# Patient Record
Sex: FEMALE | Race: WHITE | NOT HISPANIC OR LATINO | Employment: UNEMPLOYED | URBAN - METROPOLITAN AREA
[De-identification: names, ages, dates, MRNs, and addresses within clinical notes are randomized per-mention and may not be internally consistent; named-entity substitution may affect disease eponyms.]

---

## 2017-11-22 ENCOUNTER — HOSPITAL ENCOUNTER (EMERGENCY)
Facility: HOSPITAL | Age: 27
Discharge: HOME | End: 2017-11-22
Admitting: PHYSICIAN ASSISTANT

## 2017-11-22 ENCOUNTER — APPOINTMENT (OUTPATIENT)
Dept: CT IMAGING | Facility: HOSPITAL | Age: 27
End: 2017-11-22

## 2017-11-22 VITALS
BODY MASS INDEX: 18.4 KG/M2 | RESPIRATION RATE: 16 BRPM | HEIGHT: 62 IN | OXYGEN SATURATION: 98 % | DIASTOLIC BLOOD PRESSURE: 79 MMHG | TEMPERATURE: 99.2 F | HEART RATE: 60 BPM | WEIGHT: 100 LBS | SYSTOLIC BLOOD PRESSURE: 113 MMHG

## 2017-11-22 DIAGNOSIS — R31.9 URINARY TRACT INFECTION WITH HEMATURIA, SITE UNSPECIFIED: Primary | ICD-10-CM

## 2017-11-22 DIAGNOSIS — F12.10 MILD TETRAHYDROCANNABINOL (THC) ABUSE: ICD-10-CM

## 2017-11-22 DIAGNOSIS — R11.2 NON-INTRACTABLE VOMITING WITH NAUSEA, UNSPECIFIED VOMITING TYPE: ICD-10-CM

## 2017-11-22 DIAGNOSIS — N39.0 URINARY TRACT INFECTION WITH HEMATURIA, SITE UNSPECIFIED: Primary | ICD-10-CM

## 2017-11-22 DIAGNOSIS — K56.7 ILEUS (HCC): ICD-10-CM

## 2017-11-22 LAB
ALBUMIN SERPL-MCNC: 4.6 G/DL (ref 3.5–5.2)
ALBUMIN/GLOB SERPL: 1.3 G/DL
ALP SERPL-CCNC: 63 U/L (ref 40–129)
ALT SERPL W P-5'-P-CCNC: 11 U/L (ref 5–33)
AMPHET+METHAMPHET UR QL: NEGATIVE
AMPHETAMINES UR QL: NEGATIVE
ANION GAP SERPL CALCULATED.3IONS-SCNC: 14.2 MMOL/L
AST SERPL-CCNC: 15 U/L (ref 5–32)
B-HCG UR QL: NEGATIVE
BACTERIA UR QL AUTO: ABNORMAL /HPF
BARBITURATES UR QL SCN: NEGATIVE
BASOPHILS # BLD AUTO: 0.06 10*3/MM3 (ref 0–0.2)
BASOPHILS NFR BLD AUTO: 0.4 % (ref 0–2)
BENZODIAZ UR QL SCN: NEGATIVE
BILIRUB SERPL-MCNC: 0.6 MG/DL (ref 0.2–1.2)
BILIRUB UR QL STRIP: ABNORMAL
BUN BLD-MCNC: 18 MG/DL (ref 6–20)
BUN/CREAT SERPL: 22.8 (ref 7–25)
BUPRENORPHINE SERPL-MCNC: NEGATIVE NG/ML
CALCIUM SPEC-SCNC: 9.5 MG/DL (ref 8.6–10.5)
CANNABINOIDS SERPL QL: POSITIVE
CHLORIDE SERPL-SCNC: 97 MMOL/L (ref 98–107)
CLARITY UR: ABNORMAL
CO2 SERPL-SCNC: 24.8 MMOL/L (ref 22–29)
COCAINE UR QL: NEGATIVE
COLOR UR: ABNORMAL
CREAT BLD-MCNC: 0.79 MG/DL (ref 0.57–1)
DEPRECATED RDW RBC AUTO: 45.6 FL (ref 37–54)
EOSINOPHIL # BLD AUTO: 0.01 10*3/MM3 (ref 0.1–0.3)
EOSINOPHIL NFR BLD AUTO: 0.1 % (ref 0–4)
ERYTHROCYTE [DISTWIDTH] IN BLOOD BY AUTOMATED COUNT: 13.3 % (ref 11.5–14.5)
FLUAV AG NPH QL: NEGATIVE
FLUBV AG NPH QL IA: NEGATIVE
GFR SERPL CREATININE-BSD FRML MDRD: 87 ML/MIN/1.73
GLOBULIN UR ELPH-MCNC: 3.5 GM/DL
GLUCOSE BLD-MCNC: 111 MG/DL (ref 65–99)
GLUCOSE UR STRIP-MCNC: NEGATIVE MG/DL
HCT VFR BLD AUTO: 42.3 % (ref 37–47)
HGB BLD-MCNC: 14.7 G/DL (ref 12–16)
HGB UR QL STRIP.AUTO: ABNORMAL
HYALINE CASTS UR QL AUTO: ABNORMAL /LPF
IMM GRANULOCYTES # BLD: 0.05 10*3/MM3 (ref 0–0.03)
IMM GRANULOCYTES NFR BLD: 0.4 % (ref 0–0.5)
KETONES UR QL STRIP: ABNORMAL
LEUKOCYTE ESTERASE UR QL STRIP.AUTO: ABNORMAL
LIPASE SERPL-CCNC: 22 U/L (ref 13–60)
LYMPHOCYTES # BLD AUTO: 2.18 10*3/MM3 (ref 0.6–4.8)
LYMPHOCYTES NFR BLD AUTO: 15.3 % (ref 20–45)
MCH RBC QN AUTO: 32.7 PG (ref 27–31)
MCHC RBC AUTO-ENTMCNC: 34.8 G/DL (ref 31–37)
MCV RBC AUTO: 94.2 FL (ref 81–99)
METHADONE UR QL SCN: NEGATIVE
MONOCYTES # BLD AUTO: 0.62 10*3/MM3 (ref 0–1)
MONOCYTES NFR BLD AUTO: 4.4 % (ref 3–8)
NEUTROPHILS # BLD AUTO: 11.29 10*3/MM3 (ref 1.5–8.3)
NEUTROPHILS NFR BLD AUTO: 79.4 % (ref 45–70)
NITRITE UR QL STRIP: POSITIVE
NRBC BLD MANUAL-RTO: 0 /100 WBC (ref 0–0)
OPIATES UR QL: POSITIVE
OXYCODONE UR QL SCN: NEGATIVE
PCP UR QL SCN: NEGATIVE
PH UR STRIP.AUTO: 5.5 [PH] (ref 4.5–8)
PLATELET # BLD AUTO: 418 10*3/MM3 (ref 140–500)
PMV BLD AUTO: 9.8 FL (ref 7.4–10.4)
POTASSIUM BLD-SCNC: 3.8 MMOL/L (ref 3.5–5.2)
PROPOXYPH UR QL: NEGATIVE
PROT SERPL-MCNC: 8.1 G/DL (ref 6–8.5)
PROT UR QL STRIP: ABNORMAL
RBC # BLD AUTO: 4.49 10*6/MM3 (ref 4.2–5.4)
RBC # UR: ABNORMAL /HPF
REF LAB TEST METHOD: ABNORMAL
SODIUM BLD-SCNC: 136 MMOL/L (ref 136–145)
SP GR UR STRIP: 1.02 (ref 1–1.03)
SQUAMOUS #/AREA URNS HPF: ABNORMAL /HPF
TRICYCLICS UR QL SCN: NEGATIVE
UROBILINOGEN UR QL STRIP: ABNORMAL
WBC NRBC COR # BLD: 14.21 10*3/MM3 (ref 4.8–10.8)
WBC UR QL AUTO: ABNORMAL /HPF

## 2017-11-22 PROCEDURE — 25010000002 ONDANSETRON PER 1 MG: Performed by: PHYSICIAN ASSISTANT

## 2017-11-22 PROCEDURE — 80053 COMPREHEN METABOLIC PANEL: CPT | Performed by: PHYSICIAN ASSISTANT

## 2017-11-22 PROCEDURE — 87804 INFLUENZA ASSAY W/OPTIC: CPT | Performed by: PHYSICIAN ASSISTANT

## 2017-11-22 PROCEDURE — 85025 COMPLETE CBC W/AUTO DIFF WBC: CPT | Performed by: PHYSICIAN ASSISTANT

## 2017-11-22 PROCEDURE — 81025 URINE PREGNANCY TEST: CPT | Performed by: PHYSICIAN ASSISTANT

## 2017-11-22 PROCEDURE — 74177 CT ABD & PELVIS W/CONTRAST: CPT

## 2017-11-22 PROCEDURE — 99284 EMERGENCY DEPT VISIT MOD MDM: CPT | Performed by: PHYSICIAN ASSISTANT

## 2017-11-22 PROCEDURE — 25010000002 CEFTRIAXONE PER 250 MG: Performed by: PHYSICIAN ASSISTANT

## 2017-11-22 PROCEDURE — 83690 ASSAY OF LIPASE: CPT | Performed by: PHYSICIAN ASSISTANT

## 2017-11-22 PROCEDURE — 96375 TX/PRO/DX INJ NEW DRUG ADDON: CPT

## 2017-11-22 PROCEDURE — 96361 HYDRATE IV INFUSION ADD-ON: CPT

## 2017-11-22 PROCEDURE — 96365 THER/PROPH/DIAG IV INF INIT: CPT

## 2017-11-22 PROCEDURE — 87086 URINE CULTURE/COLONY COUNT: CPT | Performed by: PHYSICIAN ASSISTANT

## 2017-11-22 PROCEDURE — 81001 URINALYSIS AUTO W/SCOPE: CPT | Performed by: PHYSICIAN ASSISTANT

## 2017-11-22 PROCEDURE — 99284 EMERGENCY DEPT VISIT MOD MDM: CPT

## 2017-11-22 PROCEDURE — 25010000002 HYDROMORPHONE PER 4 MG: Performed by: EMERGENCY MEDICINE

## 2017-11-22 PROCEDURE — 80306 DRUG TEST PRSMV INSTRMNT: CPT | Performed by: PHYSICIAN ASSISTANT

## 2017-11-22 PROCEDURE — 0 IOPAMIDOL PER 1 ML: Performed by: PHYSICIAN ASSISTANT

## 2017-11-22 RX ORDER — SODIUM CHLORIDE 0.9 % (FLUSH) 0.9 %
10 SYRINGE (ML) INJECTION AS NEEDED
Status: DISCONTINUED | OUTPATIENT
Start: 2017-11-22 | End: 2017-11-22 | Stop reason: HOSPADM

## 2017-11-22 RX ORDER — ONDANSETRON 2 MG/ML
4 INJECTION INTRAMUSCULAR; INTRAVENOUS ONCE
Status: COMPLETED | OUTPATIENT
Start: 2017-11-22 | End: 2017-11-22

## 2017-11-22 RX ORDER — ACETAMINOPHEN 325 MG/1
650 TABLET ORAL ONCE
Status: COMPLETED | OUTPATIENT
Start: 2017-11-22 | End: 2017-11-22

## 2017-11-22 RX ORDER — ONDANSETRON 4 MG/1
4 TABLET, ORALLY DISINTEGRATING ORAL 4 TIMES DAILY PRN
Qty: 12 TABLET | Refills: 0 | OUTPATIENT
Start: 2017-11-22 | End: 2022-09-09

## 2017-11-22 RX ORDER — CEFUROXIME AXETIL 250 MG/1
250 TABLET ORAL 2 TIMES DAILY
Qty: 20 TABLET | Refills: 0 | Status: SHIPPED | OUTPATIENT
Start: 2017-11-22 | End: 2017-12-02

## 2017-11-22 RX ORDER — METOCLOPRAMIDE 10 MG/1
5 TABLET ORAL ONCE
Status: COMPLETED | OUTPATIENT
Start: 2017-11-22 | End: 2017-11-22

## 2017-11-22 RX ADMIN — CEFTRIAXONE 1 G: 1 INJECTION, SOLUTION INTRAVENOUS at 15:48

## 2017-11-22 RX ADMIN — ACETAMINOPHEN 650 MG: 325 TABLET, FILM COATED ORAL at 15:42

## 2017-11-22 RX ADMIN — SODIUM CHLORIDE 1000 ML: 9 INJECTION, SOLUTION INTRAVENOUS at 13:23

## 2017-11-22 RX ADMIN — ONDANSETRON 4 MG: 2 INJECTION, SOLUTION INTRAMUSCULAR; INTRAVENOUS at 13:25

## 2017-11-22 RX ADMIN — IOPAMIDOL 100 ML: 755 INJECTION, SOLUTION INTRAVENOUS at 14:07

## 2017-11-22 RX ADMIN — SODIUM CHLORIDE 500 ML: 9 INJECTION, SOLUTION INTRAVENOUS at 15:45

## 2017-11-22 RX ADMIN — METOCLOPRAMIDE 5 MG: 10 TABLET ORAL at 15:32

## 2017-11-22 RX ADMIN — HYDROMORPHONE HYDROCHLORIDE 0.5 MG: 1 INJECTION, SOLUTION INTRAMUSCULAR; INTRAVENOUS; SUBCUTANEOUS at 13:30

## 2017-11-22 NOTE — DISCHARGE INSTRUCTIONS
Return to the emergency department with worsening symptoms, uncontrolled pain, inability to tolerate oral liquids, fever greater than 101°F not controlled by Tylenol or as needed with emergent concerns.    Clear liquid diet for the next 48 hours, then advance to bland as tolerated (bananas, rice, toast, applesauce, saltine crackers), then to regular as tolerated.  Avoid narcotic medications and marijuana.

## 2017-11-22 NOTE — ED NOTES
Pt is reluctant to be discharged.  She thought that she had a blockage.  An ileus was explained to her and her father.  She then made two trips to the bathroom.  Patient's father co-signed her discharge instructions.     Elyse Mcmillan RN  11/22/17 5817

## 2017-11-22 NOTE — ED PROVIDER NOTES
Subjective   History of Present Illness  History of Present Illness    Chief complaint: Abdominal pain, nausea and vomiting    Location: Mid upper abdomen without radiation    Quality/Severity:  Sharp, severe    Timing/Duration: Pain started yesterday, nausea and vomiting started early this morning around 2 AM    Modifying Factors: Oral intake makes worse.  Nothing makes better.    Associated Symptoms: Positive nausea and vomiting.  Denies hematemesis.  Denies diarrhea or constipation.  Denies fevers but does have chills.  Denies chest pain or shortness of breath.  Denies cough.  Denies dysuria, hematuria, urgency or frequency.    Narrative: 27-year-old female presents with nausea vomiting and abdominal pain.  She denies eating any undercooked food.  She denies any travel.  She denies any sick contacts.    Review of Systems  General: Denies fevers or chills.  Denies any weakness or fatigue.  Denies any weight loss or weight gain.  SKIN: Denies any rashes lesions or ulcers.  Denies color change.    HEENT:  Denies any change in vision.  LUNGS: Denies any shortness of breath or wheezing.    CARDIAC: Denies any chest pain.  Denies palpitations.  Denies syncope.  Denies any edema  ABD: As above  : Denies any dysuria, urgency, frequency or hematuria.  Currently on period.  NEURO: Denies any weakness.  Denies headache.  Denies seizures.  Denies changes in speech or difficulty walking.  M/S: Denies arthralgias, back pain, myalgias or neck pain  PSYCH: Negative for suicidal ideas. Denies anxiety or depression   review was performed in addition to those in the above all other reviews are negative.    History reviewed. No pertinent past medical history.    No Known Allergies    History reviewed. No pertinent surgical history.    History reviewed. No pertinent family history.    Social History     Social History   • Marital status: Single     Spouse name: N/A   • Number of children: N/A   • Years of education: N/A     Social  History Main Topics   • Smoking status: Current Every Day Smoker     Packs/day: 1.00     Types: Cigarettes   • Smokeless tobacco: None   • Alcohol use No   • Drug use: No   • Sexual activity: Defer     Other Topics Concern   • None     Social History Narrative   • None       Current Facility-Administered Medications:   •  HYDROmorphone (DILAUDID) injection 0.5 mg, 0.5 mg, Intravenous, Once, Emile Viveros MD  •  ondansetron (ZOFRAN) injection 4 mg, 4 mg, Intravenous, Once, Noa Wilburn PA-C  •  sodium chloride 0.9 % bolus 1,000 mL, 1,000 mL, Intravenous, Once, Noa Wilburn PA-C  •  Insert peripheral IV, , , Once **AND** sodium chloride 0.9 % flush 10 mL, 10 mL, Intravenous, PRN, Noa Wilburn PA-C  No current outpatient prescriptions on file.        Objective   Physical Exam  Vitals:    11/22/17 1242   BP: 139/86   Pulse: 64   Resp: 18   Temp: 98.1 °F (36.7 °C)   SpO2: 99%     GENERAL: Alert and oriented ×4.  No apparent distress.  Smells of THC  SKIN: Warm, pink and dry  HEENT: Atraumatic normocephalic  LUNGS: Clear to auscultation bilaterally without wheezes, rales or rhonchi  CARDIAC: Regular rate and rhythm.  S1 and S2.  No murmurs, rubs or gallops.  ABD: Soft, diffusely tender with greatest area of tenderness to the mid epigastric.  No guarding or rebound tenderness and positive bowel sounds  M/S: MAEW, no deformity  PSYCH: Normal mood and affect    Procedures         ED Course  ED Course    IV fluids, Zofran, Dilaudid given  Reglan given.  Results for orders placed or performed during the hospital encounter of 11/22/17   Influenza Antigen, Rapid - Swab, Nasopharynx   Result Value Ref Range    Influenza A Ag, EIA Negative Negative    Influenza B Ag, EIA Negative Negative   Comprehensive Metabolic Panel   Result Value Ref Range    Glucose 111 (H) 65 - 99 mg/dL    BUN 18 6 - 20 mg/dL    Creatinine 0.79 0.57 - 1.00 mg/dL    Sodium 136 136 - 145 mmol/L    Potassium 3.8 3.5 - 5.2 mmol/L    Chloride 97  (L) 98 - 107 mmol/L    CO2 24.8 22.0 - 29.0 mmol/L    Calcium 9.5 8.6 - 10.5 mg/dL    Total Protein 8.1 6.0 - 8.5 g/dL    Albumin 4.60 3.50 - 5.20 g/dL    ALT (SGPT) 11 5 - 33 U/L    AST (SGOT) 15 5 - 32 U/L    Alkaline Phosphatase 63 40 - 129 U/L    Total Bilirubin 0.6 0.2 - 1.2 mg/dL    eGFR Non African Amer 87 >60 mL/min/1.73    Globulin 3.5 gm/dL    A/G Ratio 1.3 g/dL    BUN/Creatinine Ratio 22.8 7.0 - 25.0    Anion Gap 14.2 mmol/L   Pregnancy, Urine - Urine, Clean Catch   Result Value Ref Range    HCG, Urine QL Negative Negative   Urinalysis With / Culture If Indicated - Urine, Catheter   Result Value Ref Range    Color, UA Red (A) Yellow, Straw    Appearance, UA Turbid (A) Clear    pH, UA 5.5 4.5 - 8.0    Specific Gravity, UA 1.020 1.003 - 1.030    Glucose, UA Negative Negative    Ketones, UA 15 mg/dL (1+) (A) Negative, 80 mg/dL (3+), >=160 mg/dL (4+)    Bilirubin, UA Moderate (2+) (A) Negative    Blood, UA Large (3+) (A) Negative    Protein, UA >=300 mg/dL (3+) (A) Negative    Leuk Esterase, UA Large (3+) (A) Negative    Nitrite, UA Positive (A) Negative    Urobilinogen, UA 2.0 E.U./dL (A) 0.2 - 1.0 E.U./dL   Urine Drug Screen - Urine, Clean Catch   Result Value Ref Range    THC, Screen, Urine Positive (A) Negative    Phencyclidine (PCP), Urine Negative Negative    Cocaine Screen, Urine Negative Negative    Methamphetamine, Urine Negative Negative    Opiate Screen Positive (A) Negative    Amphetamine Screen, Urine Negative Negative    Benzodiazepine Screen, Urine Negative Negative    Tricyclic Antidepressants Screen Negative Negative    Methadone Screen, Urine Negative Negative    Barbiturates Screen, Urine Negative Negative    Oxycodone Screen, Urine Negative Negative    Propoxyphene Screen Negative Negative    Buprenorphine, Screen, Urine Negative Negative   Lipase   Result Value Ref Range    Lipase 22 13 - 60 U/L   CBC Auto Differential   Result Value Ref Range    WBC 14.21 (H) 4.80 - 10.80 10*3/mm3    RBC  4.49 4.20 - 5.40 10*6/mm3    Hemoglobin 14.7 12.0 - 16.0 g/dL    Hematocrit 42.3 37.0 - 47.0 %    MCV 94.2 81.0 - 99.0 fL    MCH 32.7 (H) 27.0 - 31.0 pg    MCHC 34.8 31.0 - 37.0 g/dL    RDW 13.3 11.5 - 14.5 %    RDW-SD 45.6 37.0 - 54.0 fl    MPV 9.8 7.4 - 10.4 fL    Platelets 418 140 - 500 10*3/mm3    Neutrophil % 79.4 (H) 45.0 - 70.0 %    Lymphocyte % 15.3 (L) 20.0 - 45.0 %    Monocyte % 4.4 3.0 - 8.0 %    Eosinophil % 0.1 0.0 - 4.0 %    Basophil % 0.4 0.0 - 2.0 %    Immature Grans % 0.4 0.0 - 0.5 %    Neutrophils, Absolute 11.29 (H) 1.50 - 8.30 10*3/mm3    Lymphocytes, Absolute 2.18 0.60 - 4.80 10*3/mm3    Monocytes, Absolute 0.62 0.00 - 1.00 10*3/mm3    Eosinophils, Absolute 0.01 (L) 0.10 - 0.30 10*3/mm3    Basophils, Absolute 0.06 0.00 - 0.20 10*3/mm3    Immature Grans, Absolute 0.05 (H) 0.00 - 0.03 10*3/mm3    nRBC 0.0 0.0 - 0.0 /100 WBC   Urinalysis, Microscopic Only - Urine, Clean Catch   Result Value Ref Range    RBC, UA Too Numerous to Count (A) None Seen /HPF    WBC, UA Unable to determine due to loaded field (A) None Seen /HPF    Bacteria, UA Unable to determine due to loaded field (A) None Seen /HPF    Squamous Epithelial Cells, UA Unable to determine due to loaded field (A) None Seen, 0-2 /HPF    Hyaline Casts, UA Unable to determine due to loaded field None Seen /LPF    Methodology Manual Light Microscopy      Reviewed CT a/p. Independently viewed by me. Interpreted by radiologist. Discussed with pt. discussing importance of returning to the ER if any worsening symptoms that ileus could progress to a bowel obstruction.  I offered admission, the patient does not want to stay.    Ct Abdomen Pelvis With Contrast    Result Date: 11/22/2017  Narrative: CT ABDOMEN PELVIS  HISTORY: Abdominal pain in the umbilicus area with nausea and vomiting since yesterday.  TECHNIQUE: Axial images were obtained through the abdomen and pelvis following IV contrast administration. Multiplanar reformats were obtained. A  comparison is made with 08/13/2012. Radiation dose reduction techniques were utilized, including automated exposure control and exposure modulation based on body size.  ABDOMEN FINDINGS: Lung bases are clear. There are multiple small hypodense lesions in the liver. These are all present on the study from 2012 and are felt to be benign. Solid organs are otherwise normal. Gallbladder is unremarkable. No biliary obstruction. Unopacified GI tract is grossly normal. No free fluid.  PELVIS FINDINGS: Urinary bladder is within normal limits. Patient is status post bilateral tubal ligation. There is a 12 mm right ovarian cyst. Solid pelvic organs are otherwise normal. Gas-filled loops of distal small bowel suggest a mild ileus. The unopacified GI tract is otherwise normal.      Impression: 1. Gas-filled loops of otherwise normal-appearing distal small bowel may reflect a mild ileus. No bowel obstruction is seen. The unopacified GI tract is otherwise within normal limits. 2. 12 mm right ovarian cyst. 3. Status post tubal ligation.  This report was finalized on 11/22/2017 3:00 PM by Dr. Scott Yu MD.      Rocephin for UTI- d/c with ceftin and zofran. Educated pt.  she understands medications to avoid as well as symptoms that would cause her to need to come back to the ER                MDM  Number of Diagnoses or Management Options  Ileus: new and requires workup  Mild tetrahydrocannabinol (THC) abuse: new and requires workup  Non-intractable vomiting with nausea, unspecified vomiting type: new and requires workup  Urinary tract infection with hematuria, site unspecified: new and requires workup     Amount and/or Complexity of Data Reviewed  Clinical lab tests: reviewed and ordered  Tests in the radiology section of CPT®: reviewed and ordered  Tests in the medicine section of CPT®: ordered and reviewed  Independent visualization of images, tracings, or specimens: yes    Risk of Complications, Morbidity, and/or  Mortality  Presenting problems: moderate  Diagnostic procedures: moderate  Management options: high    Patient Progress  Patient progress: improved    My differential diagnosis for abdominal pain includes but is not limited to:  Gastritis, gastroenteritis, peptic ulcer disease, GERD, esophageal perforation, acute appendicitis, mesenteric adenitis, Meckel’s diverticulum, epiploic appendagitis, diverticulitis, colon cancer, ulcerative colitis, Crohn’s disease, intussusception, small bowel obstruction, adhesions, ischemic bowel, perforated viscus, ileus, obstipation, biliary colic, cholecystitis, cholelithiasis, Maxime-Celestine Oliverio, hepatitis, pancreatitis, common bile duct obstruction, cholangitis, bile leak, splenic trauma, splenic rupture, splenic infarction, splenic abscess, abdominal abscess, ascites, spontaneous bacterial peritonitis, hernia, UTI, cystitis, ureterolithiasis, urinary obstruction, ovarian cyst, torsion, pregnancy, ectopic pregnancy, PID, pelvic abscess, mittelschmerz, endometriosis, AAA, myocardial infarction, pneumonia, cancer, porphyria, DKA, medications, sickle cell, viral syndrome, zoster    Final diagnoses:   Urinary tract infection with hematuria, site unspecified   Ileus   Non-intractable vomiting with nausea, unspecified vomiting type   Mild tetrahydrocannabinol (THC) abuse     Dictated utilizing Dragon dictation           Noa Wilburn PA-C  11/22/17 [id number]

## 2017-11-23 LAB
BACTERIA SPEC AEROBE CULT: NORMAL
BACTERIA SPEC AEROBE CULT: NORMAL

## 2022-05-20 ENCOUNTER — HOSPITAL ENCOUNTER (OUTPATIENT)
Dept: PSYCHIATRY | Age: 32
Setting detail: THERAPIES SERIES
Discharge: HOME | End: 2022-05-20

## 2022-05-20 VITALS
RESPIRATION RATE: 17 BRPM | OXYGEN SATURATION: 98 % | TEMPERATURE: 97.4 F | SYSTOLIC BLOOD PRESSURE: 114 MMHG | HEART RATE: 105 BPM | DIASTOLIC BLOOD PRESSURE: 85 MMHG

## 2022-05-20 RX ORDER — DIVALPROEX SODIUM 250 MG/1
250 TABLET, DELAYED RELEASE ORAL 2 TIMES DAILY
Qty: 60 TABLET | Refills: 0 | Status: SHIPPED | OUTPATIENT
Start: 2022-05-20

## 2022-05-21 NOTE — H&P
[address]                 [address] [address]                              HISTORY AND PHYSICAL    PATIENT NAME: Radha Pereira                       :        1990  MED REC NO:   [id number]                          ROOM:  ACCOUNT NO:   [de-identified]                           ADMIT DATE: 2022  PROVIDER:     Joyce Elizalde MD    CHIEF COMPLAINT:  Mood lability. HISTORY OF PRESENT ILLNESS:  The patient is a 69-year-old female who is  referred for evaluation by Dr. Yasmeen Raymond from [address] [place] [address] which is an  outpatient service. The patient was initially involved with Dr. Shade Hicks  through the court system. She had done a DUI and was smoking marijuana  with her daughter in the car. CPS got involved. She was kicked out of  the family home and had the custody to her mother-in-law. Currently,  the 10year-old and 70-year-old live with their dad. She stated that  \"wanted an evaluation for parenting skills. \"  Dr. Shade Hicks according to the  patient reported that she had minimal concern and the patient was seeing  her children in public at this time. The patient is concerned about her  moods. She states that she will become somewhat hypomanic at times with  restlessness and becoming easily agitated. She wakes up hourly at night  and is very reactive to choices. She stated that she has been this way  most of her life. She states that marijuana does help her calm down as  she becomes very irritated, but does not become physically aggressive. She is verbally reactive, very talkative and pressured speech. She  denies any prior suicidal ideation or attempts. PAST MEDICATIONS:  Include Xanax, Zoloft, Paxil, citalopram, which she  feels are not helpful. PRIOR PSYCHIATRIC HISTORY:  Inpatient at the [facility] in  for 28 days,  diagnosed with ADHD, bipolar disorder, depression, and marijuana use.    Outpatient, she goes to [facility] for therapy weekly, went to [facility] in  2018 and has been seeing Dr. Wood Mohamud through court. CURRENT MEDICATIONS:  None. MEDICAL HISTORY:  She is healthy. LEGAL ISSUES:  She has a DUI and marijuana possession charge. TRAUMA HISTORY:  At age 20, she discovered that her grandfather is her  biological father and father is her biological brother. At 20 years of  age, mother left for two years with a \"Fernando\" and she had to help raise  the family. Mother is back in there and she has now established some  type of relationship with her again. SOCIAL HISTORY:  Lives in her own home with 25year-old niece. ALLERGIES TO MEDICATIONS:  None. FAMILY PSYCHIATRIC HISTORY:  Aunt, bipolar. Brother, drug addiction and  clean now. VITAL SIGNS:  Temperature 97.4, pulse 105, respirations 17, blood  pressure 114/85. MENTAL STATUS EXAM:  The patient is a very pleasant, cooperative,  39-year-old female. She made good eye contact. She engaged well. She  spoke spontaneously, but some slight pressure to that. Speech was  slightly rapid. Denied any threats to harm self or others. Denied any  auditory or visual hallucinations. Speech showed logical and coherent  thoughts. Insight and judgment are intact. She is oriented to person,  place and time. Fund of knowledge and language are good. Attention and  concentration were good. Able to recall the objects immediately. DIAGNOSES:  Axis I:  Cyclothymia. Axis II:  Deferred. Axis III:  Unremarkable. Axis IV:  Moderate. Axis V:  75. PLAN:  1. We will begin a trial of Depakote 250 mg b.i.d. for mood  stabilization. Explained risks, benefits, side effects of medications. 2.  We will order laboratories including a TSH to rule out thyroid  abnormalities as well as a comprehensive metabolic profile with renal  and hepatic function as well as a CBC with differential.  3.  Will be back within the next month and also obtain a Depakote level  at that time.   4.  Continue with  [facility] for outpatient treatment. Spent approximately 70 minutes on this eval with more than 50% of time  discussing patient care and treatment options.         Valeria Elaine MD    D: 05/20/2022 17:10:03       T: 05/20/2022 17:12:47     [email or web address]  Job#: [id number]     Doc#: [id number]    CC:

## 2022-06-21 ENCOUNTER — FOLLOWUP TELEPHONE ENCOUNTER (OUTPATIENT)
Dept: PSYCHIATRY | Age: 32
End: 2022-06-21

## 2022-06-22 ENCOUNTER — HOSPITAL ENCOUNTER (OUTPATIENT)
Dept: PSYCHIATRY | Age: 32
Setting detail: THERAPIES SERIES
Discharge: HOME | End: 2022-06-22

## 2022-06-28 ENCOUNTER — FOLLOWUP TELEPHONE ENCOUNTER (OUTPATIENT)
Dept: PSYCHIATRY | Age: 32
End: 2022-06-28

## 2022-06-29 ENCOUNTER — HOSPITAL ENCOUNTER (OUTPATIENT)
Dept: PSYCHIATRY | Age: 32
Setting detail: THERAPIES SERIES
Discharge: HOME | End: 2022-06-29
Payer: MEDICAID

## 2022-06-29 ENCOUNTER — HOSPITAL ENCOUNTER (OUTPATIENT)
Age: 32
Discharge: HOME | End: 2022-06-29
Payer: MEDICAID

## 2022-06-29 VITALS
TEMPERATURE: 97.5 F | RESPIRATION RATE: 17 BRPM | OXYGEN SATURATION: 99 % | SYSTOLIC BLOOD PRESSURE: 107 MMHG | DIASTOLIC BLOOD PRESSURE: 71 MMHG | HEART RATE: 69 BPM

## 2022-06-29 DIAGNOSIS — F34.0 CYCLOTHYMIC DISORDER: ICD-10-CM

## 2022-06-29 LAB
A/G RATIO: 1.8 (ref 1.1–2.2)
ALBUMIN SERPL-MCNC: 4.3 G/DL (ref 3.4–5)
ALP BLD-CCNC: 60 U/L (ref 40–129)
ALT SERPL-CCNC: 25 U/L (ref 10–40)
ANION GAP SERPL CALCULATED.3IONS-SCNC: 10 MMOL/L (ref 3–16)
AST SERPL-CCNC: 26 U/L (ref 15–37)
BASOPHILS ABSOLUTE: 0 K/UL (ref 0–0.2)
BASOPHILS RELATIVE PERCENT: 0.5 %
BILIRUB SERPL-MCNC: <0.2 MG/DL (ref 0–1)
BUN BLDV-MCNC: 9 MG/DL (ref 7–20)
CALCIUM SERPL-MCNC: 9 MG/DL (ref 8.3–10.6)
CHLORIDE BLD-SCNC: 104 MMOL/L (ref 99–110)
CO2: 25 MMOL/L (ref 21–32)
CREAT SERPL-MCNC: 0.8 MG/DL (ref 0.6–1.1)
EOSINOPHILS ABSOLUTE: 0.1 K/UL (ref 0–0.6)
EOSINOPHILS RELATIVE PERCENT: 1.2 %
GFR AFRICAN AMERICAN: >60
GFR NON-AFRICAN AMERICAN: >60
GLUCOSE BLD-MCNC: 106 MG/DL (ref 70–99)
HCT VFR BLD CALC: 34.9 % (ref 36–48)
HEMOGLOBIN: 11.9 G/DL (ref 12–16)
LYMPHOCYTES ABSOLUTE: 1.7 K/UL (ref 1–5.1)
LYMPHOCYTES RELATIVE PERCENT: 23.3 %
MCH RBC QN AUTO: 32.7 PG (ref 26–34)
MCHC RBC AUTO-ENTMCNC: 33.9 G/DL (ref 31–36)
MCV RBC AUTO: 96.3 FL (ref 80–100)
MONOCYTES ABSOLUTE: 0.5 K/UL (ref 0–1.3)
MONOCYTES RELATIVE PERCENT: 6.7 %
NEUTROPHILS ABSOLUTE: 4.9 K/UL (ref 1.7–7.7)
NEUTROPHILS RELATIVE PERCENT: 68.3 %
PDW BLD-RTO: 14.6 % (ref 12.4–15.4)
PLATELET # BLD: 279 K/UL (ref 135–450)
PMV BLD AUTO: 8.7 FL (ref 5–10.5)
POTASSIUM REFLEX MAGNESIUM: 4.1 MMOL/L (ref 3.5–5.1)
RBC # BLD: 3.63 M/UL (ref 4–5.2)
SODIUM BLD-SCNC: 139 MMOL/L (ref 136–145)
TOTAL PROTEIN: 6.7 G/DL (ref 6.4–8.2)
TSH REFLEX: 1.98 UIU/ML (ref 0.27–4.2)
WBC # BLD: 7.2 K/UL (ref 4–11)

## 2022-06-29 PROCEDURE — 80053 COMPREHEN METABOLIC PANEL: CPT

## 2022-06-29 PROCEDURE — 84443 ASSAY THYROID STIM HORMONE: CPT

## 2022-06-29 PROCEDURE — 99214 OFFICE O/P EST MOD 30 MIN: CPT | Performed by: PSYCHIATRY & NEUROLOGY

## 2022-06-29 PROCEDURE — 85025 COMPLETE CBC W/AUTO DIFF WBC: CPT

## 2022-06-29 PROCEDURE — 36415 COLL VENOUS BLD VENIPUNCTURE: CPT

## 2022-06-29 NOTE — PROGRESS NOTES
Department of Psychiatry  AttendingProgress Note  Chief Complaint: mood lability  Jeremy Ayala is doing well today. She started Depakote for a few days and stopped as she was tired. Restarted it 2 days ago. Has not had lab work. Is excited since she was given supervised visitation for her 2 days  6/20, and will get unsupervised visits starting 7/20. Will order labs today and get Depakote level after she has been on it for at least a full week. Patient's chart was reviewed and collaborated with  about the treatment plan. SUBJECTIVE:    Patient is feeling better. Suicidal ideation:  denies suicidal ideation. Patient does not have medication side effects. ROS: Patient has new complaints: no  Sleeping adequately:  Yes   Appetite adequate: Yes  Attending groups: No: NA  Visitors:Na    OBJECTIVE    Physical  VITALS:  /71   Pulse 69   Temp 97.5 °F (36.4 °C) (Temporal)   Resp 17   SpO2 99%     Mental Status Examination:  Patients appearance was well-appearing. Thoughts are Goal directed. Homicidal ideations none. No abnormal movements, tics or mannerisms. Memory intact Aims 0. Concentration Good. Alert and oriented X 4. Insight and Judgement normal insight and judgment. Patient was cooperative. Patient gait normal. Mood within normal limits, affect normal affect Hallucinations Absent, suicidal ideations no specific plan to harm self Speech normal volume  Data  Labs:   No visits with results within 2 Day(s) from this visit. Latest known visit with results is:   No results found for any previous visit. Medications  No current facility-administered medications for this encounter. ASSESSMENT AND PLAN    Principal Problem:    Cyclothymic disorder  Resolved Problems:    * No resolved hospital problems. *       1. Patient s symptoms   are improving. Follow up with Dr. Winifred Estrella at [facility]  2. Probable discharge is 1-2 months  3. Discharge planning is incomplete  4.  Suicidal ideation is none  5. Total time with patient was 40 minutes and more than 50 % of that time was spent counseling the patient on their symptoms, treatment and expected goals.

## 2022-09-09 ENCOUNTER — HOSPITAL ENCOUNTER (EMERGENCY)
Facility: HOSPITAL | Age: 32
Discharge: HOME | End: 2022-09-09
Attending: EMERGENCY MEDICINE | Admitting: EMERGENCY MEDICINE

## 2022-09-09 ENCOUNTER — APPOINTMENT (OUTPATIENT)
Dept: CT IMAGING | Facility: HOSPITAL | Age: 32
End: 2022-09-09

## 2022-09-09 VITALS
WEIGHT: 110 LBS | SYSTOLIC BLOOD PRESSURE: 123 MMHG | HEART RATE: 67 BPM | HEIGHT: 62 IN | TEMPERATURE: 97.7 F | DIASTOLIC BLOOD PRESSURE: 72 MMHG | OXYGEN SATURATION: 99 % | BODY MASS INDEX: 20.24 KG/M2 | RESPIRATION RATE: 16 BRPM

## 2022-09-09 DIAGNOSIS — R11.2 NAUSEA AND VOMITING, UNSPECIFIED VOMITING TYPE: ICD-10-CM

## 2022-09-09 DIAGNOSIS — R10.9 ABDOMINAL PAIN, UNSPECIFIED ABDOMINAL LOCATION: Primary | ICD-10-CM

## 2022-09-09 LAB
ALBUMIN SERPL-MCNC: 5 G/DL (ref 3.5–5.2)
ALBUMIN/GLOB SERPL: 1.8 G/DL
ALP SERPL-CCNC: 67 U/L (ref 39–117)
ALT SERPL W P-5'-P-CCNC: 17 U/L (ref 1–33)
ANION GAP SERPL CALCULATED.3IONS-SCNC: 9.6 MMOL/L (ref 5–15)
AST SERPL-CCNC: 19 U/L (ref 1–32)
BASOPHILS # BLD AUTO: 0.05 10*3/MM3 (ref 0–0.2)
BASOPHILS NFR BLD AUTO: 0.5 % (ref 0–1.5)
BILIRUB SERPL-MCNC: 0.3 MG/DL (ref 0–1.2)
BILIRUB UR QL STRIP: NEGATIVE
BUN SERPL-MCNC: 18 MG/DL (ref 6–20)
BUN/CREAT SERPL: 23.7 (ref 7–25)
CALCIUM SPEC-SCNC: 10 MG/DL (ref 8.6–10.5)
CHLORIDE SERPL-SCNC: 103 MMOL/L (ref 98–107)
CLARITY UR: CLEAR
CO2 SERPL-SCNC: 24.4 MMOL/L (ref 22–29)
COLOR UR: YELLOW
CREAT SERPL-MCNC: 0.76 MG/DL (ref 0.57–1)
DEPRECATED RDW RBC AUTO: 52.9 FL (ref 37–54)
EGFRCR SERPLBLD CKD-EPI 2021: 106.3 ML/MIN/1.73
EOSINOPHIL # BLD AUTO: 0.02 10*3/MM3 (ref 0–0.4)
EOSINOPHIL NFR BLD AUTO: 0.2 % (ref 0.3–6.2)
ERYTHROCYTE [DISTWIDTH] IN BLOOD BY AUTOMATED COUNT: 14.2 % (ref 12.3–15.4)
FLUAV RNA RESP QL NAA+PROBE: NOT DETECTED
FLUBV RNA RESP QL NAA+PROBE: NOT DETECTED
GLOBULIN UR ELPH-MCNC: 2.8 GM/DL
GLUCOSE SERPL-MCNC: 106 MG/DL (ref 65–99)
GLUCOSE UR STRIP-MCNC: NEGATIVE MG/DL
HCG SERPL QL: NEGATIVE
HCT VFR BLD AUTO: 44.1 % (ref 34–46.6)
HGB BLD-MCNC: 14.2 G/DL (ref 12–15.9)
HGB UR QL STRIP.AUTO: NEGATIVE
IMM GRANULOCYTES # BLD AUTO: 0.02 10*3/MM3 (ref 0–0.05)
IMM GRANULOCYTES NFR BLD AUTO: 0.2 % (ref 0–0.5)
KETONES UR QL STRIP: NEGATIVE
LEUKOCYTE ESTERASE UR QL STRIP.AUTO: NEGATIVE
LIPASE SERPL-CCNC: 26 U/L (ref 13–60)
LYMPHOCYTES # BLD AUTO: 1.37 10*3/MM3 (ref 0.7–3.1)
LYMPHOCYTES NFR BLD AUTO: 12.8 % (ref 19.6–45.3)
MCH RBC QN AUTO: 31.8 PG (ref 26.6–33)
MCHC RBC AUTO-ENTMCNC: 32.2 G/DL (ref 31.5–35.7)
MCV RBC AUTO: 98.7 FL (ref 79–97)
MONOCYTES # BLD AUTO: 0.29 10*3/MM3 (ref 0.1–0.9)
MONOCYTES NFR BLD AUTO: 2.7 % (ref 5–12)
NEUTROPHILS NFR BLD AUTO: 8.96 10*3/MM3 (ref 1.7–7)
NEUTROPHILS NFR BLD AUTO: 83.6 % (ref 42.7–76)
NITRITE UR QL STRIP: NEGATIVE
PH UR STRIP.AUTO: 8.5 [PH] (ref 4.5–8)
PLATELET # BLD AUTO: 443 10*3/MM3 (ref 140–450)
PMV BLD AUTO: 9.3 FL (ref 6–12)
POTASSIUM SERPL-SCNC: 4.3 MMOL/L (ref 3.5–5.2)
PROT SERPL-MCNC: 7.8 G/DL (ref 6–8.5)
PROT UR QL STRIP: NEGATIVE
RBC # BLD AUTO: 4.47 10*6/MM3 (ref 3.77–5.28)
SARS-COV-2 RNA RESP QL NAA+PROBE: NOT DETECTED
SODIUM SERPL-SCNC: 137 MMOL/L (ref 136–145)
SP GR UR STRIP: 1.02 (ref 1–1.03)
UROBILINOGEN UR QL STRIP: ABNORMAL
WBC NRBC COR # BLD: 10.71 10*3/MM3 (ref 3.4–10.8)

## 2022-09-09 PROCEDURE — 25010000002 ONDANSETRON PER 1 MG

## 2022-09-09 PROCEDURE — 80053 COMPREHEN METABOLIC PANEL: CPT

## 2022-09-09 PROCEDURE — 85025 COMPLETE CBC W/AUTO DIFF WBC: CPT

## 2022-09-09 PROCEDURE — 83690 ASSAY OF LIPASE: CPT

## 2022-09-09 PROCEDURE — 25010000002 IOPAMIDOL 61 % SOLUTION: Performed by: EMERGENCY MEDICINE

## 2022-09-09 PROCEDURE — 99283 EMERGENCY DEPT VISIT LOW MDM: CPT

## 2022-09-09 PROCEDURE — 96374 THER/PROPH/DIAG INJ IV PUSH: CPT

## 2022-09-09 PROCEDURE — 81003 URINALYSIS AUTO W/O SCOPE: CPT

## 2022-09-09 PROCEDURE — 74177 CT ABD & PELVIS W/CONTRAST: CPT

## 2022-09-09 PROCEDURE — 87636 SARSCOV2 & INF A&B AMP PRB: CPT

## 2022-09-09 PROCEDURE — 84703 CHORIONIC GONADOTROPIN ASSAY: CPT

## 2022-09-09 RX ORDER — SODIUM CHLORIDE 0.9 % (FLUSH) 0.9 %
10 SYRINGE (ML) INJECTION AS NEEDED
Status: DISCONTINUED | OUTPATIENT
Start: 2022-09-09 | End: 2022-09-09 | Stop reason: HOSPADM

## 2022-09-09 RX ORDER — ONDANSETRON 2 MG/ML
8 INJECTION INTRAMUSCULAR; INTRAVENOUS ONCE
Status: COMPLETED | OUTPATIENT
Start: 2022-09-09 | End: 2022-09-09

## 2022-09-09 RX ORDER — DICYCLOMINE HYDROCHLORIDE 10 MG/1
20 CAPSULE ORAL ONCE
Status: DISCONTINUED | OUTPATIENT
Start: 2022-09-09 | End: 2022-09-09 | Stop reason: HOSPADM

## 2022-09-09 RX ORDER — DICYCLOMINE HCL 20 MG
20 TABLET ORAL EVERY 8 HOURS PRN
Qty: 15 TABLET | Refills: 0 | Status: SHIPPED | OUTPATIENT
Start: 2022-09-09 | End: 2022-09-14

## 2022-09-09 RX ORDER — ONDANSETRON 4 MG/1
4 TABLET, ORALLY DISINTEGRATING ORAL EVERY 8 HOURS PRN
Qty: 15 TABLET | Refills: 0 | Status: SHIPPED | OUTPATIENT
Start: 2022-09-09 | End: 2022-09-14

## 2022-09-09 RX ADMIN — ONDANSETRON 8 MG: 2 INJECTION INTRAMUSCULAR; INTRAVENOUS at 12:16

## 2022-09-09 RX ADMIN — SODIUM CHLORIDE 1000 ML: 9 INJECTION, SOLUTION INTRAVENOUS at 12:14

## 2022-09-09 RX ADMIN — IOPAMIDOL 100 ML: 612 INJECTION, SOLUTION INTRAVENOUS at 13:00

## 2022-09-09 NOTE — DISCHARGE INSTRUCTIONS
Medications as directed.  Follow-up with your PCP as above.  Return to ED for worsening symptoms, medical emergencies.

## 2022-09-09 NOTE — ED PROVIDER NOTES
EMERGENCY DEPARTMENT ENCOUNTER      Room Number: 09/09    History is provided by the patient, no translation services needed    HPI:    Chief complaint: Abdominal pain    Location: Left-sided, LLQ worse than LUQ    Quality/Severity:  Patient describes the pain as an aching and throbbing pain. She rates the severity a 10 out of 10.    Timing/Duration: 3 days    Modifying Factors: Eating makes the pain worse    Associated Symptoms: Nausea, vomiting    Narrative: Pt is a 32 y.o. female who presents complaining of left-sided abdominal pain x3 days. Patient advises that the pain started in the center of her abdomen after eating [employer] 3 days ago. She states that the pain went away and then returned the next day after she ate. When the pain returned, it returned on the left side of her abdomen and it worse in the lower quadrant. She describes the pain as an aching and throbbing pain that she rates a 10 out of 10. She admits to associated nausea and vomiting. She denies any diarrhea, blood in her stool, dysuria, hematuria, or difficulty urinating. Patient denies any chest pain, shortness of breath, or cough. She denies any headaches, dizziness, vision changes, or known sick person contacts.       PMD: Provider, No Known    REVIEW OF SYSTEMS  Review of Systems   Constitutional: Negative for chills and fever.   HENT: Negative for congestion, rhinorrhea and sore throat.    Eyes: Negative for visual disturbance.   Respiratory: Negative for cough, chest tightness and shortness of breath.    Cardiovascular: Negative for chest pain and leg swelling.   Gastrointestinal: Positive for abdominal pain (Left-sided), nausea and vomiting. Negative for blood in stool, constipation and diarrhea.   Genitourinary: Negative for difficulty urinating, dysuria, flank pain and hematuria.   Musculoskeletal: Negative for back pain, gait problem and myalgias.   Skin: Negative for color change, pallor, rash and wound.   Neurological: Negative  for dizziness, syncope, weakness and headaches.   Psychiatric/Behavioral: Negative for confusion. The patient is not nervous/anxious.          PAST MEDICAL HISTORY  Active Ambulatory Problems     Diagnosis Date Noted   • No Active Ambulatory Problems     Resolved Ambulatory Problems     Diagnosis Date Noted   • No Resolved Ambulatory Problems     No Additional Past Medical History       PAST SURGICAL HISTORY  No past surgical history on file.    FAMILY HISTORY  No family history on file.    SOCIAL HISTORY  Social History     Socioeconomic History   • Marital status: Single   Tobacco Use   • Smoking status: Current Every Day Smoker     Packs/day: 1.00     Types: Cigarettes   Substance and Sexual Activity   • Alcohol use: No   • Drug use: No   • Sexual activity: Defer       ALLERGIES  Patient has no known allergies.      Current Facility-Administered Medications:   •  dicyclomine (BENTYL) capsule 20 mg, 20 mg, Oral, Once, Britt Maynard PA-C  •  [COMPLETED] Insert peripheral IV, , , Once **AND** sodium chloride 0.9 % flush 10 mL, 10 mL, Intravenous, PRN, Britt Maynard PA-C    Current Outpatient Medications:   •  dicyclomine (BENTYL) 20 MG tablet, Take 1 tablet by mouth Every 8 (Eight) Hours As Needed (Abd pain) for up to 5 days., Disp: 15 tablet, Rfl: 0  •  ondansetron ODT (ZOFRAN-ODT) 4 MG disintegrating tablet, Place 1 tablet on the tongue Every 8 (Eight) Hours As Needed for Nausea or Vomiting for up to 5 days., Disp: 15 tablet, Rfl: 0    PHYSICAL EXAM  ED Triage Vitals [09/09/22 1148]   Temp Heart Rate Resp BP SpO2   97.7 °F (36.5 °C) 67 16 123/72 99 %      Temp src Heart Rate Source Patient Position BP Location FiO2 (%)   -- -- -- -- --       Physical Exam  Vitals and nursing note reviewed.   Constitutional:       General: She is not in acute distress.     Appearance: She is well-developed. She is not ill-appearing, toxic-appearing or diaphoretic.   HENT:      Head: Normocephalic and atraumatic.   Eyes:       General: No scleral icterus.     Extraocular Movements: Extraocular movements intact.      Conjunctiva/sclera: Conjunctivae normal.      Pupils: Pupils are equal, round, and reactive to light.   Cardiovascular:      Rate and Rhythm: Normal rate and regular rhythm.      Heart sounds: Normal heart sounds.   Pulmonary:      Effort: Pulmonary effort is normal. No respiratory distress.      Breath sounds: Normal breath sounds. No stridor. No wheezing, rhonchi or rales.   Chest:      Chest wall: No tenderness.   Abdominal:      General: Bowel sounds are normal. There is no distension.      Palpations: Abdomen is soft.      Tenderness: There is abdominal tenderness in the left upper quadrant and left lower quadrant. There is no guarding or rebound. Negative signs include Waller's sign, Rovsing's sign and McBurney's sign.   Musculoskeletal:         General: Normal range of motion.      Cervical back: Normal range of motion and neck supple.   Skin:     General: Skin is warm and dry.      Coloration: Skin is not cyanotic, jaundiced, mottled or pale.      Findings: No erythema or rash.   Neurological:      Mental Status: She is alert and oriented to person, place, and time.   Psychiatric:         Mood and Affect: Mood and affect normal.           LAB RESULTS  Lab Results (last 24 hours)     Procedure Component Value Units Date/Time    COVID-19 and FLU A/B PCR - Swab, Nasopharynx [[id number]]  (Normal) Collected: 09/09/22 1213    Specimen: Swab from Nasopharynx Updated: 09/09/22 1245     COVID19 Not Detected     Influenza A PCR Not Detected     Influenza B PCR Not Detected    Narrative:      Fact sheet for providers: https://www.fda.gov/media/447286/download    Fact sheet for patients: https://www.fda.gov/media/265544/download    Test performed by PCR.    CBC & Differential [[id number]]  (Abnormal) Collected: 09/09/22 1213    Specimen: Blood Updated: 09/09/22 1226    Narrative:      The following orders were created for panel  order CBC & Differential.  Procedure                               Abnormality         Status                     ---------                               -----------         ------                     CBC Auto Differential[[id number]]        Abnormal            Final result                 Please view results for these tests on the individual orders.    Comprehensive Metabolic Panel [[id number]]  (Abnormal) Collected: 09/09/22 1213    Specimen: Blood Updated: 09/09/22 1244     Glucose 106 mg/dL      BUN 18 mg/dL      Creatinine 0.76 mg/dL      Sodium 137 mmol/L      Potassium 4.3 mmol/L      Chloride 103 mmol/L      CO2 24.4 mmol/L      Calcium 10.0 mg/dL      Total Protein 7.8 g/dL      Albumin 5.00 g/dL      ALT (SGPT) 17 U/L      AST (SGOT) 19 U/L      Alkaline Phosphatase 67 U/L      Total Bilirubin 0.3 mg/dL      Globulin 2.8 gm/dL      A/G Ratio 1.8 g/dL      BUN/Creatinine Ratio 23.7     Anion Gap 9.6 mmol/L      eGFR 106.3 mL/min/1.73      Comment: National Kidney Foundation and American Society of Nephrology (ASN) Task Force recommended calculation based on the Chronic Kidney Disease Epidemiology Collaboration (CKD-EPI) equation refit without adjustment for race.       Narrative:      GFR Normal >60  Chronic Kidney Disease <60  Kidney Failure <15      Lipase [[id number]]  (Normal) Collected: 09/09/22 1213    Specimen: Blood Updated: 09/09/22 1244     Lipase 26 U/L     hCG, Serum, Qualitative [[id number]]  (Normal) Collected: 09/09/22 1213    Specimen: Blood Updated: 09/09/22 1244     HCG Qualitative Negative    CBC Auto Differential [[id number]]  (Abnormal) Collected: 09/09/22 1213    Specimen: Blood Updated: 09/09/22 1226     WBC 10.71 10*3/mm3      RBC 4.47 10*6/mm3      Hemoglobin 14.2 g/dL      Hematocrit 44.1 %      MCV 98.7 fL      MCH 31.8 pg      MCHC 32.2 g/dL      RDW 14.2 %      RDW-SD 52.9 fl      MPV 9.3 fL      Platelets 443 10*3/mm3      Neutrophil % 83.6 %      Lymphocyte % 12.8 %      Monocyte %  2.7 %      Eosinophil % 0.2 %      Basophil % 0.5 %      Immature Grans % 0.2 %      Neutrophils, Absolute 8.96 10*3/mm3      Lymphocytes, Absolute 1.37 10*3/mm3      Monocytes, Absolute 0.29 10*3/mm3      Eosinophils, Absolute 0.02 10*3/mm3      Basophils, Absolute 0.05 10*3/mm3      Immature Grans, Absolute 0.02 10*3/mm3     Urinalysis With Microscopic If Indicated (No Culture) - Urine, Clean Catch [[id number]]  (Abnormal) Collected: 09/09/22 1337    Specimen: Urine, Clean Catch Updated: 09/09/22 1344     Color, UA Yellow     Appearance, UA Clear     pH, UA 8.5     Specific Gravity, UA 1.020     Glucose, UA Negative     Ketones, UA Negative     Bilirubin, UA Negative     Blood, UA Negative     Protein, UA Negative     Leuk Esterase, UA Negative     Nitrite, UA Negative     Urobilinogen, UA 0.2 E.U./dL    Narrative:      Urine microscopic not indicated.            I ordered the above labs and reviewed the results    RADIOLOGY  CT Abdomen Pelvis With Contrast    Result Date: 9/9/2022  CT ABDOMEN AND PELVIS WITH CONTRAST, 9/9/2022 HISTORY: 32-year-old female in the ED complaining of 3 day history left side abdomen pain, nausea and vomiting. TECHNIQUE: CT imaging of the abdomen and pelvis with IV contrast. Radiation dose reduction techniques included automated exposure control. Radiation audit for CT and nuclear cardiology exams in the last 12 months: 0. COMPARISON: *  CT abdomen/pelvis, 11/22/2017. ABDOMEN FINDINGS: Both kidneys are normal in appearance with normal parenchymal enhancement. No evidence of upper urinary tract obstruction or renal inflammation. No gallbladder distention or bile duct dilatation. Three tiny benign hepatic hemangiomas are unchanged. Liver, pancreas and spleen are otherwise negative. Small bowel and colon are normal in caliber and appearance. The appendix is not directly visualized, but there are no inflammatory findings in the expected location of the appendix. No gastric distention, hiatal  hernia or distal esophageal dilatation. PELVIS FINDINGS: Uterus, ovaries, urinary bladder and rectum are within normal limits. Bilateral tubal ligation clips. Trace physiologic free pelvic fluid. No inguinal hernia. Lung base images show no active disease.     Negative CT imaging of the abdomen and pelvis. Signer Name: Obey Benoit MD  Signed: 9/9/2022 1:23 PM  Workstation [facility]  [facility]      I ordered the above radiologic testing and reviewed the results    PROCEDURES  Procedures      PROGRESS AND CONSULTS  ED Course as of 09/09/22 1407   Fri Sep 09, 2022   1406 Patient states that she feels better at this time.  She is requesting to be sent home.  Results were discussed with the patient.  She expressed understanding.  Follow-up instructions given.  Return to the ED instructions given. [AH]      ED Course User Index  [AH] Britt Maynard PA-C           MEDICAL DECISION MAKING    MDM     My differential diagnosis for abdominal pain includes but is not limited to:  Gastritis, gastroenteritis, peptic ulcer disease, GERD, esophageal perforation, acute appendicitis, mesenteric adenitis, Meckel’s diverticulum, epiploic appendagitis, diverticulitis, colon cancer, ulcerative colitis, Crohn’s disease, intussusception, small bowel obstruction, adhesions, ischemic bowel, perforated viscus, ileus, obstipation, biliary colic, cholecystitis, cholelithiasis, Maxime-Celestine Oliverio, hepatitis, pancreatitis, common bile duct obstruction, cholangitis, bile leak, splenic trauma, splenic rupture, splenic infarction, splenic abscess, abdominal abscess, ascites, spontaneous bacterial peritonitis, hernia, UTI, cystitis,ureterolithiasis, urinary obstruction, ovarian cyst, torsion, pregnancy, ectopic pregnancy, PID, pelvic abscess, mittelschmerz, endometriosis, AAA, myocardial infarction, pneumonia, cancer, porphyria, DKA, medications, sickle cell, viral syndrome, zoster  DIAGNOSIS  Final  diagnoses:   Abdominal pain, unspecified abdominal location   Nausea and vomiting, unspecified vomiting type       Latest Documented Vital Signs:  As of 14:07 EDT  BP- 123/72 HR- 67 Temp- 97.7 °F (36.5 °C) O2 sat- 99%    DISPOSITION  Pt discharged    Discussed pertinent findings with the patient/family.  Patient/Family voiced understanding of need to follow-up for recheck and further testing as needed.  Return to the Emergency Department warnings were given.         Medication List      New Prescriptions    dicyclomine 20 MG tablet  Commonly known as: BENTYL  Take 1 tablet by mouth Every 8 (Eight) Hours As Needed (Abd pain) for up to 5 days.        Changed    ondansetron ODT 4 MG disintegrating tablet  Commonly known as: ZOFRAN-ODT  Place 1 tablet on the tongue Every 8 (Eight) Hours As Needed for Nausea or Vomiting for up to 5 days.  What changed:   · how to take this  · when to take this           Where to Get Your Medications      These medications were sent to [facility] - [address] [address] AT [facility]  & [address] [phone] [facility] [phone]   [address] [address]    Phone: [phone]   · dicyclomine 20 MG tablet  · ondansetron ODT 4 MG disintegrating tablet              Follow-up Information     Provider, No Known. Call today.    Why: to schedule follow up  Contact information:  [facility] [address]             Go to  [facility].    Specialty: Emergency Medicine  Why: If symptoms worsen  Contact information:  [facility] [place] [address] [phone]                         Dictated utilizing Dragon dictation     Britt Maynard PA-C  09/09/22 9159

## 2022-09-21 ENCOUNTER — PATIENT OUTREACH (OUTPATIENT)
Dept: CASE MANAGEMENT | Facility: OTHER | Age: 32
End: 2022-09-21

## 2022-09-21 NOTE — OUTREACH NOTE
AMBULATORY CASE MANAGEMENT NOTE    Name and Relationship of Patient/Support Person: Claire Mock - Self    Patient Outreach    Introduced self, explained ACM RN role and provided contact information. Spoke with patient regarding health and wellness after ED visit. Patient states she is doing okay. Patient declined assistance to secure a PCP. No other needs at this time. Patient will call ACM if needs arise.     Education Documentation  No documentation found.        GIN LEA  Ambulatory Case Management    9/21/2022, 15:04 EDT